# Patient Record
Sex: FEMALE | HISPANIC OR LATINO
[De-identification: names, ages, dates, MRNs, and addresses within clinical notes are randomized per-mention and may not be internally consistent; named-entity substitution may affect disease eponyms.]

---

## 2017-11-24 PROBLEM — Z00.00 ENCOUNTER FOR PREVENTIVE HEALTH EXAMINATION: Status: ACTIVE | Noted: 2017-11-24

## 2017-12-19 ENCOUNTER — APPOINTMENT (OUTPATIENT)
Dept: ANTEPARTUM | Facility: CLINIC | Age: 42
End: 2017-12-19

## 2020-07-14 ENCOUNTER — APPOINTMENT (OUTPATIENT)
Dept: ANTEPARTUM | Facility: CLINIC | Age: 45
End: 2020-07-14
Payer: COMMERCIAL

## 2020-07-14 ENCOUNTER — ASOB RESULT (OUTPATIENT)
Age: 45
End: 2020-07-14

## 2020-07-14 PROCEDURE — 76830 TRANSVAGINAL US NON-OB: CPT | Mod: 59

## 2020-07-14 PROCEDURE — 76856 US EXAM PELVIC COMPLETE: CPT | Mod: 59

## 2021-12-17 ENCOUNTER — ASOB RESULT (OUTPATIENT)
Age: 46
End: 2021-12-17

## 2021-12-17 ENCOUNTER — APPOINTMENT (OUTPATIENT)
Dept: ANTEPARTUM | Facility: CLINIC | Age: 46
End: 2021-12-17
Payer: COMMERCIAL

## 2021-12-17 PROCEDURE — 76830 TRANSVAGINAL US NON-OB: CPT | Mod: 59

## 2021-12-17 PROCEDURE — 76856 US EXAM PELVIC COMPLETE: CPT | Mod: 59

## 2022-11-21 ENCOUNTER — APPOINTMENT (OUTPATIENT)
Dept: DERMATOLOGY | Facility: CLINIC | Age: 47
End: 2022-11-21

## 2022-11-21 PROCEDURE — 99203 OFFICE O/P NEW LOW 30 MIN: CPT

## 2022-12-01 ENCOUNTER — LABORATORY RESULT (OUTPATIENT)
Age: 47
End: 2022-12-01

## 2022-12-02 ENCOUNTER — APPOINTMENT (OUTPATIENT)
Dept: DERMATOLOGY | Facility: CLINIC | Age: 47
End: 2022-12-02

## 2022-12-02 DIAGNOSIS — L72.0 EPIDERMAL CYST: ICD-10-CM

## 2022-12-02 DIAGNOSIS — D48.5 NEOPLASM OF UNCERTAIN BEHAVIOR OF SKIN: ICD-10-CM

## 2022-12-02 PROCEDURE — 11402 EXC TR-EXT B9+MARG 1.1-2 CM: CPT

## 2022-12-02 PROCEDURE — 12032 INTMD RPR S/A/T/EXT 2.6-7.5: CPT

## 2022-12-02 RX ORDER — MUPIROCIN 20 MG/G
2 OINTMENT TOPICAL TWICE DAILY
Qty: 1 | Refills: 0 | Status: ACTIVE | COMMUNITY
Start: 2022-12-02 | End: 1900-01-01

## 2022-12-03 PROBLEM — L72.0 EPIDERMAL CYST: Status: ACTIVE | Noted: 2022-12-03

## 2022-12-03 PROBLEM — D48.5 NEOPLASM OF UNCERTAIN BEHAVIOR OF SKIN: Status: ACTIVE | Noted: 2022-12-02

## 2022-12-12 ENCOUNTER — NON-APPOINTMENT (OUTPATIENT)
Age: 47
End: 2022-12-12

## 2023-12-08 ENCOUNTER — NON-APPOINTMENT (OUTPATIENT)
Age: 48
End: 2023-12-08

## 2024-03-26 ENCOUNTER — APPOINTMENT (OUTPATIENT)
Dept: UROGYNECOLOGY | Facility: CLINIC | Age: 49
End: 2024-03-26
Payer: COMMERCIAL

## 2024-03-26 ENCOUNTER — TRANSCRIPTION ENCOUNTER (OUTPATIENT)
Age: 49
End: 2024-03-26

## 2024-03-26 VITALS
SYSTOLIC BLOOD PRESSURE: 140 MMHG | HEART RATE: 78 BPM | HEIGHT: 62 IN | OXYGEN SATURATION: 98 % | BODY MASS INDEX: 32.76 KG/M2 | DIASTOLIC BLOOD PRESSURE: 85 MMHG | WEIGHT: 178 LBS

## 2024-03-26 DIAGNOSIS — Z82.49 FAMILY HISTORY OF ISCHEMIC HEART DISEASE AND OTHER DISEASES OF THE CIRCULATORY SYSTEM: ICD-10-CM

## 2024-03-26 DIAGNOSIS — R32 UNSPECIFIED URINARY INCONTINENCE: ICD-10-CM

## 2024-03-26 DIAGNOSIS — I10 ESSENTIAL (PRIMARY) HYPERTENSION: ICD-10-CM

## 2024-03-26 DIAGNOSIS — N39.0 URINARY TRACT INFECTION, SITE NOT SPECIFIED: ICD-10-CM

## 2024-03-26 DIAGNOSIS — R10.2 PELVIC AND PERINEAL PAIN: ICD-10-CM

## 2024-03-26 DIAGNOSIS — Z78.9 OTHER SPECIFIED HEALTH STATUS: ICD-10-CM

## 2024-03-26 DIAGNOSIS — Z63.5 DISRUPTION OF FAMILY BY SEPARATION AND DIVORCE: ICD-10-CM

## 2024-03-26 DIAGNOSIS — Z83.3 FAMILY HISTORY OF DIABETES MELLITUS: ICD-10-CM

## 2024-03-26 DIAGNOSIS — N81.9 FEMALE GENITAL PROLAPSE, UNSPECIFIED: ICD-10-CM

## 2024-03-26 LAB
BILIRUB UR QL STRIP: NORMAL
COLLECTION METHOD: NORMAL
GLUCOSE UR-MCNC: NEGATIVE
HCG UR QL: 0.2 EU/DL
HGB UR QL STRIP.AUTO: NEGATIVE
KETONES UR-MCNC: NORMAL
LEUKOCYTE ESTERASE UR QL STRIP: NEGATIVE
NITRITE UR QL STRIP: NEGATIVE
PH UR STRIP: 7.5
PROT UR STRIP-MCNC: NEGATIVE
SP GR UR STRIP: 1.01

## 2024-03-26 PROCEDURE — 81003 URINALYSIS AUTO W/O SCOPE: CPT | Mod: QW

## 2024-03-26 PROCEDURE — 99204 OFFICE O/P NEW MOD 45 MIN: CPT | Mod: 25

## 2024-03-26 PROCEDURE — 99459 PELVIC EXAMINATION: CPT | Mod: 25

## 2024-03-26 PROCEDURE — 51701 INSERT BLADDER CATHETER: CPT | Mod: 59

## 2024-03-26 RX ORDER — AMLODIPINE BESYLATE 5 MG/1
TABLET ORAL
Refills: 0 | Status: ACTIVE | COMMUNITY

## 2024-03-26 RX ORDER — LISINOPRIL 30 MG/1
TABLET ORAL
Refills: 0 | Status: ACTIVE | COMMUNITY

## 2024-03-26 SDOH — SOCIAL STABILITY - SOCIAL INSECURITY: DISRUPTION OF FAMILY BY SEPARATION AND DIVORCE: Z63.5

## 2024-03-26 NOTE — PROCEDURE
[Straight Catheterization] : insertion of a straight catheter [Urgent Incontinence] : urgent incontinence [Patient] : the patient [___ Fr Straight Tip] : a [unfilled] in [place] straight tip catheter [None] : there were no complications with the catheter insertion [Clear] : clear [No Complications] : no complications [Tolerated Well] : the patient tolerated the procedure well [Post procedure instructions and information given] : Post procedure instructions and information were given and reviewed with patient. [0] : 0

## 2024-03-26 NOTE — DISCUSSION/SUMMARY
[FreeTextEntry1] : GALILEO is a 48 year female who presents for stage I POP. On exam, negative CST, normal PVR.  Visual illustrations were used to demonstrate prolapse. We reviewed management options for her prolapse including: observation, pelvic floor exercises with and without physical therapy, pessary, and surgical management. We reviewed the different types of surgical procedures including abdominal, vaginal, and robotic/laparoscopic procedures. In addition we reviewed procedures that involve hysterectomy as well as surgeries with uterine preservation. Mesh and non-mesh options were reviewed. IUGA information on prolapse was given to her.  [] PFE  f/u 6 months  All questions answered.

## 2024-03-26 NOTE — HISTORY OF PRESENT ILLNESS
[Vaginal Wall Prolapse] : no [Rectal Prolapse] : mild [Unable To Restrain Bowel Movement] : mild [Urinary Frequency] : no [Feelings Of Urinary Urgency] : no [Urinary Frequency More Than Twice At Night (Nocturia)] : no nocturia [Constipation Obstructed Defecation] : no [Pelvic Pain] : no [Vaginal Pain] : mild [Cystocele (Obstetric)] : mild [] : years ago [Urinary Tract Infection] : mild [FreeTextEntry1] : GALILEO is a 48 year female who presents for UTIs and POP. Referred by gyn. Reports dropped uterus. Reports pelvic heaviness and feels a ball dropping. Reports bulge symptoms for about 1 year. States she needs to rush to the bathroom otherwise she will leak. No longer child bearing. Ablation for polyp in 2023.    Daytime frequency: No  Nocturia: No  Urinary urgency: Yes  Leakage of urine with urgency: Yes  Leakage of urine with coughing sneezing laughing: Yes  Sensation of incomplete bladder emptying: No  History of frequent urinary tract infections: Yes  History of hematuria: No  Previous treatment: None  Vaginal symptoms: Pelvic pain. Bulge symptoms.  Bowel symptoms: Denies.    OB: [birth year]  GYN: LMP 2023, Normal pap 2023, No estrogen use PMH: HTN PSH: Ablation Meds: Amlodipine, Lisinopril Allx: NKDA

## 2024-03-26 NOTE — PHYSICAL EXAM
[Chaperone Present] : A chaperone was present in the examining room during all aspects of the physical examination [No Acute Distress] : in no acute distress [Well developed] : well developed [Well Nourished] : ~L well nourished [Oriented x3] : oriented to person, place, and time [No Edema] : ~T edema was not present [Warm and Dry] : was warm and dry to touch [Labia Majora] : were normal [Labia Minora] : were normal [Normal Appearance] : general appearance was normal [Post Void Residual ____ml] : post void residual was [unfilled] ml [Aa ____] : Aa [unfilled] [Ba ____] : Ba [unfilled] [C ____] : C [unfilled] [GH ____] : GH [unfilled] [PB ____] : PB [unfilled] [TVL ____] : TVL  [unfilled] [Ap ____] : Ap [unfilled] [Bp ____] : Bp [unfilled] [D ____] : D [unfilled] [] : I [Normal] : normal [Soft] :  the cervix was soft [Uterine Adnexae] : were not tender and not enlarged [Tenderness] : ~T no ~M abdominal tenderness observed [Cough] : no cough [FreeTextEntry3] : neg CST [Distended] : not distended

## 2024-03-26 NOTE — OB HISTORY
[Vaginal ___] : [unfilled] vaginal delivery(s) [Definite ___ (Date)] : the last menstrual period was [unfilled] [Last Pap Smear ___] : date of last pap smear was on [unfilled] [Sexually Active] : sexually active [Abnormal Pap Smear] : normal pap smear [Taking Estrogens] : is not taking estrogen replacement [FreeTextEntry1] : largest baby 5lbs

## 2024-03-26 NOTE — REASON FOR VISIT
[Initial Visit ___] : an initial visit for [unfilled] [Questionnaire Received] : Patient questionnaire received [Intake Form Reviewed] : Patient intake form with past medical history, surgical history, family history and social history reviewed today [[employer] ] : provided by [employer]   [Time Spent: ____ minutes] : Total time spent using  services: [unfilled] minutes. The patient's primary language is not English thus required  services. [Pelvic Organ Prolapse] : pelvic organ prolapse [Recurrent Urinary Infections] : recurrent urinary infections [Interpreters_FullName] : Naila [Interpreters_IDNumber] : [id number] [TWNoteComboBox1] : [place]

## 2024-09-25 ENCOUNTER — APPOINTMENT (OUTPATIENT)
Dept: UROGYNECOLOGY | Facility: CLINIC | Age: 49
End: 2024-09-25

## 2024-09-25 VITALS
SYSTOLIC BLOOD PRESSURE: 114 MMHG | WEIGHT: 178 LBS | BODY MASS INDEX: 32.76 KG/M2 | HEIGHT: 62 IN | DIASTOLIC BLOOD PRESSURE: 96 MMHG | HEART RATE: 82 BPM

## 2024-09-25 DIAGNOSIS — N81.9 FEMALE GENITAL PROLAPSE, UNSPECIFIED: ICD-10-CM

## 2024-09-25 PROCEDURE — 99213 OFFICE O/P EST LOW 20 MIN: CPT

## 2024-09-25 PROCEDURE — 51798 US URINE CAPACITY MEASURE: CPT

## 2024-09-25 NOTE — DISCUSSION/SUMMARY
[FreeTextEntry1] : We reviewed management options in [place] for her prolapse and would like to start pelvic floor exercises with physical therapy. We reviewed on initial visit, there will be a pelvic exam. Script placed and advised [facility] is about 3 months wait. Patient verbalized understanding. She does not want to trial pessary at this time. Follow up in 6 months or sooner if needed. Instructed to call with questions or concerns.  [] PFPT  f/u 6 months All questions answered.

## 2024-09-25 NOTE — HISTORY OF PRESENT ILLNESS
[FreeTextEntry1] : Maribel is a 49 y/o F who presents for POP f/u. She was initially seen in office 03/26/24 and stage 1 POP was noted on exam. Management options for prolapse were reviewed with patient and she wanted to trial PFE at home first and does not really see any improvement in pelvic pressure/heaviness and still feels . She reports she started trying Kegel exercises on her own but denies improvement in bulge. Denies YUKI symptoms but does note needs to archer to the bathroom when bladder is full otherwise, she will leak. No longer childbearing. Ablation for polyp in 12/2023.

## 2025-03-26 ENCOUNTER — APPOINTMENT (OUTPATIENT)
Dept: UROGYNECOLOGY | Facility: CLINIC | Age: 50
End: 2025-03-26